# Patient Record
Sex: MALE | Race: OTHER | HISPANIC OR LATINO | URBAN - METROPOLITAN AREA
[De-identification: names, ages, dates, MRNs, and addresses within clinical notes are randomized per-mention and may not be internally consistent; named-entity substitution may affect disease eponyms.]

---

## 2024-04-20 ENCOUNTER — EMERGENCY (EMERGENCY)
Facility: HOSPITAL | Age: 24
LOS: 1 days | Discharge: HOME | End: 2024-04-20
Admitting: STUDENT IN AN ORGANIZED HEALTH CARE EDUCATION/TRAINING PROGRAM
Payer: SELF-PAY

## 2024-04-20 VITALS
SYSTOLIC BLOOD PRESSURE: 105 MMHG | TEMPERATURE: 98 F | HEART RATE: 64 BPM | DIASTOLIC BLOOD PRESSURE: 61 MMHG | RESPIRATION RATE: 18 BRPM | OXYGEN SATURATION: 98 % | WEIGHT: 88.18 LBS

## 2024-04-20 PROCEDURE — 99282 EMERGENCY DEPT VISIT SF MDM: CPT

## 2024-04-20 PROCEDURE — 99283 EMERGENCY DEPT VISIT LOW MDM: CPT

## 2024-04-20 NOTE — ED PROVIDER NOTE - NSFOLLOWUPINSTRUCTIONS_ED_ALL_ED_FT
[place] tylenol 650 mg o motrin 400-800 mg según sea necesario cada 4-6 horas para el dolor.  DESCANSO: descanse la articulación o extremidad dolorida o lesionada para disminuir el dolor y la hinchazón anna 24 a 48 horas.  HIELO: aplique hielo en el área del dolor para disminuir la inflamación y el dolor, coloque markos toalla/tony entre el hielo y la piel. Puede dejar el hielo puesto anna 20 minutos seguidos, de 4 a 8 veces al día.  COMPRESIÓN: use markos venda o aparato ortopédico ariela soporte para reducir la hinchazón. Asegúrese de no envolver demasiado, afloje si la piel se siente entumecida/hormigueante o si la piel se pone doug.  ELEVACIÓN: Eleve el área lesionada/dolida 6 pulgadas o más alrededor del nivel del corazón para disminuir la hinchazón/inflamación. Utilice markos almohada debajo de la articulación para elevar el área
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
## 2024-04-20 NOTE — ED PROVIDER NOTE - PATIENT PORTAL LINK FT
You can access the FollowMyHealth Patient Portal offered by [facility] by registering at the following website: [email or web address]. By joining [employer]’s FollowMyHealth portal, you will also be able to view your health information using other applications (apps) compatible with our system.

## 2024-04-20 NOTE — ED PROVIDER NOTE - PHYSICAL EXAMINATION
Gen: well appearing, no acute distress  Skin: warm/dry, no rash noted  Neck/Back: no midline ttp/step off  HEENT: ncat, eomi, mmm  Resp: breathing comfortably, speaking in full sentences, no dyspnea  Ext: FROM all joints, SILT, no joint ttp or deformity- no R knee ttp, skin intact, distal pulses 2+, brisk cap refill   Neuro: alert/oriented, ambulatory w/ steady gait
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
## 2024-04-22 DIAGNOSIS — M25.561 PAIN IN RIGHT KNEE: ICD-10-CM
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
## 2024-04-22 DIAGNOSIS — S80.01XA CONTUSION OF RIGHT KNEE, INITIAL ENCOUNTER: ICD-10-CM
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
